# Patient Record
Sex: MALE | Race: WHITE | Employment: FULL TIME | URBAN - METROPOLITAN AREA
[De-identification: names, ages, dates, MRNs, and addresses within clinical notes are randomized per-mention and may not be internally consistent; named-entity substitution may affect disease eponyms.]

---

## 2019-02-05 ENCOUNTER — OFFICE VISIT (OUTPATIENT)
Dept: UROLOGY | Facility: CLINIC | Age: 29
End: 2019-02-05
Payer: COMMERCIAL

## 2019-02-05 VITALS — SYSTOLIC BLOOD PRESSURE: 146 MMHG | RESPIRATION RATE: 16 BRPM | HEART RATE: 85 BPM | DIASTOLIC BLOOD PRESSURE: 95 MMHG

## 2019-02-05 DIAGNOSIS — Z30.09 ENCOUNTER FOR VASECTOMY COUNSELING: Primary | ICD-10-CM

## 2019-02-05 PROCEDURE — 99201 ZZC OFFICE/OUTPT VISIT, NEW, LEVEL I: CPT | Performed by: UROLOGY

## 2019-02-05 NOTE — NURSING NOTE
Initial BP (!) 146/95 (BP Location: Right arm, Patient Position: Chair, Cuff Size: Adult Regular)   Pulse 85   Resp 16  There is no height or weight on file to calculate BMI. .    martín mcmillan LPN

## 2019-02-06 NOTE — PROGRESS NOTES
Ta Rivera  YOB: 1990  MRN:  [id number]    This is a 28 year old male requesting vasectomy counseling.    The risks and benefits of the procedure including the possible association with prostate cancer were discussed.    On examination the vas deferens were easily palpable.    Impression/Plan:  Satisfactory candidate for elective vasectomy

## 2020-08-10 ENCOUNTER — APPOINTMENT (OUTPATIENT)
Dept: GENERAL RADIOLOGY | Facility: CLINIC | Age: 30
End: 2020-08-10
Attending: NURSE PRACTITIONER
Payer: COMMERCIAL

## 2020-08-10 ENCOUNTER — HOSPITAL ENCOUNTER (EMERGENCY)
Facility: CLINIC | Age: 30
Discharge: HOME | End: 2020-08-10
Attending: NURSE PRACTITIONER | Admitting: NURSE PRACTITIONER
Payer: COMMERCIAL

## 2020-08-10 VITALS
SYSTOLIC BLOOD PRESSURE: 156 MMHG | TEMPERATURE: 98.8 F | RESPIRATION RATE: 16 BRPM | DIASTOLIC BLOOD PRESSURE: 88 MMHG | WEIGHT: 230 LBS

## 2020-08-10 DIAGNOSIS — S49.92XA SHOULDER INJURY, LEFT, INITIAL ENCOUNTER: Primary | ICD-10-CM

## 2020-08-10 PROCEDURE — 99284 EMERGENCY DEPT VISIT MOD MDM: CPT | Mod: Z6 | Performed by: NURSE PRACTITIONER

## 2020-08-10 PROCEDURE — 99283 EMERGENCY DEPT VISIT LOW MDM: CPT | Performed by: NURSE PRACTITIONER

## 2020-08-10 PROCEDURE — 73030 X-RAY EXAM OF SHOULDER: CPT | Mod: LT

## 2020-08-10 ASSESSMENT — ENCOUNTER SYMPTOMS
VOMITING: 0
NAUSEA: 0
DIARRHEA: 0
CHILLS: 0
DIAPHORESIS: 0
COUGH: 0
BLOOD IN STOOL: 0
HEMATURIA: 0
WOUND: 0
DIFFICULTY URINATING: 0
SPEECH DIFFICULTY: 0
MYALGIAS: 0
DYSURIA: 0
SHORTNESS OF BREATH: 0
WHEEZING: 0
FEVER: 0
EYE PAIN: 0
SORE THROAT: 0
ABDOMINAL PAIN: 0

## 2020-08-10 NOTE — ED NOTES
Pt reports pain to left shoulder, worsens with movement. Pt was riding a scooter last night, fell off landing on left shoulder. Pt denies hitting head, pt was wearing a helmet

## 2020-08-10 NOTE — ED AVS SNAPSHOT
[facility] Emergency Department  [address] [address]  Phone:  [phone]  Fax:  [phone]                                    Ta Rivera   MRN: [id number]    Department:  [facility] Emergency Department   Date of Visit:  8/10/2020           After Visit Summary Signature Page    I have received my discharge instructions, and my questions have been answered. I have discussed any challenges I see with this plan with the nurse or doctor.    ..........................................................................................................................................  Patient/Patient Representative Signature      ..........................................................................................................................................  Patient Representative Print Name and Relationship to Patient    ..................................................               ................................................  Date                                   Time    ..........................................................................................................................................  Reviewed by Signature/Title    ...................................................              ..............................................  Date                                               Time          22EPIC Rev 08/18

## 2020-08-10 NOTE — ED PROVIDER NOTES
History     Chief Complaint   Patient presents with     Shoulder Pain     R after skateboard accident yesterday     HPI  Ta Rivera is a 30 year old male with unremarkable past medical past surgical history currently smoking cigarettes who presents the emergency room with left shoulder pain following falling off his skateboard/scooter last evening and landing on his left shoulder region.  Patient reports acute left shoulder pain that he cites is a 2 out of 10 on a pain scale at rest and a 6 out of 10 on the pain scale with movement.  Patient reports overhead reaching is painful.  Patient reports reaching and pushing and pulling is painful.  Patient denies loss of sensation or strength.  Patient states that he took 3 tablets of ibuprofen last night and 3 tablets this morning and this has helped somewhat with his pain.  Patient denies previous injury to his left shoulder.    Allergies:  No Known Allergies    Problem List:    There are no active problems to display for this patient.       Past Medical History:    History reviewed. No pertinent past medical history.    Past Surgical History:    History reviewed. No pertinent surgical history.    Family History:    History reviewed. No pertinent family history.    Social History:  Marital Status:   [2]  Social History     Tobacco Use     Smoking status: Current Every Day Smoker     Packs/day: 1.00     Years: 7.00     Pack years: 7.00     Types: Cigarettes     Smokeless tobacco: Never Used   Substance Use Topics     Alcohol use: Yes     Alcohol/week: 24.0 standard drinks     Types: 24 Cans of beer per week     Drug use: Never        Medications:    No current outpatient medications on file.        Review of Systems   Constitutional: Negative for chills, diaphoresis and fever.   HENT: Negative for ear pain and sore throat.    Eyes: Negative for pain.   Respiratory: Negative for cough, shortness of breath and wheezing.    Cardiovascular: Negative for chest  pain.   Gastrointestinal: Negative for abdominal pain, blood in stool, diarrhea, nausea and vomiting.   Genitourinary: Negative for difficulty urinating, dysuria and hematuria.   Musculoskeletal: Negative for myalgias.        Left shoulder pain without swelling, deformity   Skin: Negative for rash and wound.   Neurological: Negative for speech difficulty.   Psychiatric/Behavioral: Negative for self-injury.   All other systems reviewed and are negative.      Physical Exam   BP: (!) 156/88  Heart Rate: 69  Temp: 98.8  F (37.1  C)  Resp: 16  Weight: 104.3 kg (230 lb)      Physical Exam  Vitals signs and nursing note reviewed.   Constitutional:       General: He is not in acute distress.     Appearance: He is well-developed. He is not diaphoretic.   HENT:      Head: Normocephalic and atraumatic.      Right Ear: External ear normal.      Left Ear: External ear normal.      Nose: Nose normal.   Eyes:      General:         Right eye: No discharge.         Left eye: No discharge.      Conjunctiva/sclera: Conjunctivae normal.   Cardiovascular:      Rate and Rhythm: Normal rate and regular rhythm.      Heart sounds: Normal heart sounds. No murmur. No friction rub. No gallop.    Pulmonary:      Effort: Pulmonary effort is normal.      Breath sounds: Normal breath sounds. No stridor. No wheezing.   Abdominal:      Tenderness: There is no abdominal tenderness.   Musculoskeletal:      Left shoulder: He exhibits tenderness (at ac joint without deformity) and bony tenderness (AC joint). He exhibits normal range of motion, no swelling, no effusion, no crepitus, no deformity, no laceration, no pain, no spasm, normal pulse and normal strength.   Skin:     General: Skin is warm.      Findings: No rash.   Neurological:      Mental Status: He is alert and oriented to person, place, and time.         ED Course        Procedures      Results for orders placed or performed during the hospital encounter of 08/10/20 (from the past 24 hour(s))    XR Shoulder Left 2 Views    Narrative    XR SHOULDER 2 VIEW LEFT 8/10/2020 12:14 PM     HISTORY: fell off scooter yesterday persistent pain, pain is at AC  joint, concern for possible separation versus clavicle fracture, no  tenting.      Impression    IMPRESSION: No apparent fracture or dislocation. No AC joint widening.    KALANI FISH MD       Medications - No data to display    Assessments & Plan (with Medical Decision Making).  Ta Rivera is a 30 year old male with unremarkable past medical past surgical history currently smoking cigarettes who presents the emergency room with left shoulder pain following falling off his skateboard/scooter last evening and landing on his left shoulder region.  On exam patient has no obvious bony deformities and no tenting of his shoulder region.  Patient has neurovascularly stable and strength is equal bilateral upper arms.  X-ray of the shoulder obtained to evaluate for AC joint separation, fracture, dislocation and reveals no obvious fracture dislocation or AC AC joint separation.  Will treat as acute shoulder injury with possible sub-radiological AC joint separation and placed in sling with orthopedic follow-up.  Discussed Tylenol and ibuprofen for pain management.  Patient discharged in stable condition     I have reviewed the nursing notes.    I have reviewed the findings, diagnosis, plan and need for follow up with the patient.    There are no discharge medications for this patient.      Final diagnoses:   Shoulder injury, left, initial encounter       8/10/2020   [facility] EMERGENCY DEPARTMENT     Ne Gaston APRN CNP  08/10/20 [id number]

## 2020-08-10 NOTE — DISCHARGE INSTRUCTIONS
Use sling for comfort and rest for the next week.  Follow-up with orthopedics for reevaluation.  They will call you and schedule sometime within the next week.  In 5 days start doing circling exercises.  You may take Tylenol 1000 mg up to 4 times daily as needed for pain management if pain is more severe than this you may add ibuprofen 600 mg and take this at the same time as you take the Tylenol for additional pain management.  Return to the emergency room if you develop sudden loss of sensation of your fingers or arm or acute worsening of pain.

## 2020-08-10 NOTE — ED TRIAGE NOTES
Pt here with pain in the L shoulder after he fell on the skateboard yesterday onto the L shoulder into the ditch. Pt denies hitting his head and is not on any blood thinners.

## 2020-08-14 ENCOUNTER — OFFICE VISIT (OUTPATIENT)
Dept: ORTHOPEDICS | Facility: CLINIC | Age: 30
End: 2020-08-14
Payer: COMMERCIAL

## 2020-08-14 VITALS — WEIGHT: 230 LBS | DIASTOLIC BLOOD PRESSURE: 70 MMHG | SYSTOLIC BLOOD PRESSURE: 110 MMHG

## 2020-08-14 DIAGNOSIS — S49.92XA SHOULDER INJURY, LEFT, INITIAL ENCOUNTER: ICD-10-CM

## 2020-08-14 PROCEDURE — 99203 OFFICE O/P NEW LOW 30 MIN: CPT | Performed by: FAMILY MEDICINE

## 2020-08-14 NOTE — LETTER
8/14/2020         RE: Ta Rivera  [address] [facility] [address]        Dear Colleague,    Thank you for referring your patient, Ta Rivera, to the [facility]. Please see a copy of my visit note below.    ASSESSMENT & PLAN  Ta was seen today for pain.    Diagnoses and all orders for this visit:    Shoulder injury, left, initial encounter  -     Orthopedic & Spine  Referral      Patient is a 30 year old male presenting for evaluation of   Chief Complaint   Patient presents with     Left Shoulder - Pain      # Left AC Separation: Notable after a fall from an electric scooter with acute pain over AC joint and TTP over joint with pos cross arm test.  Pain improved while in sling.  XR in ER on 8/10/20 neg for fracture or sig AC separation.  Likely grade 1 AC separation.  Counseled patient on nature of condition and treatment options.  Given this plan as below, follow-up as needed  Treatment: sling as needed for the next week, ice PRN, no work restrictions  Physical Therapy HEP given today, if not improved can refer for formal PT    Medications  Limited NSAIDs/Tylenol    Concerning signs/sx that would warrant urgent evaluation were discussed.  All questions were answered, patient understands and agrees with plan.      Return if symptoms worsen or fail to improve.    -----    SUBJECTIVE  Ta Rivera is a/an 30 year old Right handed male who is seen in consultation at the request of  Ne Gaston C.N.P. for evaluation of left shoulder pain. The patient is seen by themselves.    Onset: 8/10/20, 4 day(s) ago. Patient describes injury as e scooter, fall onto shoulder. Patient was seen in ED 8/10/20 and xrays were performed.   Location of Pain: left superior shoulder   Rating of Pain at worst: 7/10  Rating of Pain Currently: 2/10  Worsened by: lifting, shoulder flexion, reaching   Better with: sling   Treatments tried: ice, Ibuprofen,  sling  Associated symptoms: no distal numbness or tingling; denies swelling or warmth  Orthopedic history: NO  Relevant surgical history: NO  Work: manages burial lines for [employer]  Social: 3 kids   History reviewed. No pertinent past medical history.  Social History     Socioeconomic History     Marital status:      Spouse name: None     Number of children: None     Years of education: None     Highest education level: None   Occupational History     None   Social Needs     Financial resource strain: None     Food insecurity     Worry: None     Inability: None     Transportation needs     Medical: None     Non-medical: None   Tobacco Use     Smoking status: Current Every Day Smoker     Packs/day: 1.00     Years: 7.00     Pack years: 7.00     Types: Cigarettes     Smokeless tobacco: Never Used   Substance and Sexual Activity     Alcohol use: Yes     Alcohol/week: 24.0 standard drinks     Types: 24 Cans of beer per week     Drug use: Never     Sexual activity: None   Lifestyle     Physical activity     Days per week: None     Minutes per session: None     Stress: None   Relationships     Social connections     Talks on phone: None     Gets together: None     Attends Shinto service: None     Active member of club or organization: None     Attends meetings of clubs or organizations: None     Relationship status: None     Intimate partner violence     Fear of current or ex partner: None     Emotionally abused: None     Physically abused: None     Forced sexual activity: None   Other Topics Concern     None   Social History Narrative     None         Patient's past medical, surgical, social, and family histories were reviewed today and no changes are noted.  No family history pertinent to the patient's problem today    REVIEW OF SYSTEMS:  10 point ROS is negative other than symptoms noted above in HPI, Past Medical History or as stated below  Constitutional: NEGATIVE for fever, chills, change in  weight  Skin: NEGATIVE for worrisome rashes, moles or lesions  GI/: NEGATIVE for bowel or bladder changes  Neuro: NEGATIVE for weakness, dizziness or paresthesias    OBJECTIVE:  /70   Wt 104.3 kg (230 lb)    General: healthy, alert and in no distress  HEENT: no scleral icterus or conjunctival erythema  Skin: no suspicious lesions or rash. No jaundice.  CV: regular rhythm by palpation  Resp: normal respiratory effort without conversational dyspnea   Psych: normal mood and affect  Gait: normal steady gait with appropriate coordination and balance  Neuro: normal light touch sensory exam of the bilateral upper extremities.    MSK:  LEFT SHOULDER  Inspection:    no swelling, bruising, discoloration, or obvious deformity or asymmetry  Palpation:    Tender about the AC joint. Remainder of bony and tendinous landmarks are nontender.  Active Range of Motion:     Abduction 1650, FF 1650, , IR L4.    Strength:    Scapular plane abduction 5/5,  ER 5/5, IR 5/5, biceps 5/5, triceps 5/5  Special Tests:    Positive: crossed arm adduction and [place]'s    Negative: Neer's, Miller', supraspinatus (empty can), Speed's and Yergason's    CERVICAL SPINE  Inspection:    normal cervical lordosis present, rounded shoulders, forward head posture  Palpation:  Nontender.  Range of Motion:     Flexion full    Extension full    Right side bend full    Left side bend full    Right rotation full    Left rotation full  Strength:    Full strength throughout all neck muscles  Special Tests:    Positive: None    Negative: Spurling's (bilateral)    Independent visualization of the below image:  No results found for this or any previous visit (from the past 24 hour(s)).  XR SHOULDER 2 VIEW LEFT 8/10/2020 12:14 PM      HISTORY: fell off scooter yesterday persistent pain, pain is at AC  joint, concern for possible separation versus clavicle fracture, no  tenting.                                                                       IMPRESSION: No apparent fracture or dislocation. No AC joint widening.     MD Brad CANO MD [facility]      Again, thank you for allowing me to participate in the care of your patient.        Sincerely,        Brad Ramos MD

## 2020-08-14 NOTE — PROGRESS NOTES
ASSESSMENT & PLAN  Ta was seen today for pain.    Diagnoses and all orders for this visit:    Shoulder injury, left, initial encounter  -     Orthopedic & Spine  Referral      Patient is a 30 year old male presenting for evaluation of   Chief Complaint   Patient presents with     Left Shoulder - Pain      # Left AC Separation: Notable after a fall from an electric scooter with acute pain over AC joint and TTP over joint with pos cross arm test.  Pain improved while in sling.  XR in ER on 8/10/20 neg for fracture or sig AC separation.  Likely grade 1 AC separation.  Counseled patient on nature of condition and treatment options.  Given this plan as below, follow-up as needed  Treatment: sling as needed for the next week, ice PRN, no work restrictions  Physical Therapy HEP given today, if not improved can refer for formal PT    Medications  Limited NSAIDs/Tylenol    Concerning signs/sx that would warrant urgent evaluation were discussed.  All questions were answered, patient understands and agrees with plan.      Return if symptoms worsen or fail to improve.    -----    SUBJECTIVE  Ta Rivera is a/an 30 year old Right handed male who is seen in consultation at the request of  Ne Gaston C.N.P. for evaluation of left shoulder pain. The patient is seen by themselves.    Onset: 8/10/20, 4 day(s) ago. Patient describes injury as e scooter, fall onto shoulder. Patient was seen in ED 8/10/20 and xrays were performed.   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 No pertinent past medical history.  Social History      Socioeconomic History     Marital status:      Spouse name: None     Number of children: None     Years of education: None     Highest education level: None   Occupational History     None   Social Needs     Financial resource strain: None     Food insecurity     Worry: None     Inability: None     Transportation needs     Medical: None     Non-medical: None   Tobacco Use     Smoking status: Current Every Day Smoker     Packs/day: 1.00     Years: 7.00     Pack years: 7.00     Types: Cigarettes     Smokeless tobacco: Never Used   Substance and Sexual Activity     Alcohol use: Yes     Alcohol/week: 24.0 standard drinks     Types: 24 Cans of beer per week     Drug use: Never     Sexual activity: None   Lifestyle     Physical activity     Days per week: None     Minutes per session: None     Stress: None   Relationships     Social connections     Talks on phone: None     Gets together: None     Attends Evangelical service: None     Active member of club or organization: None     Attends meetings of clubs or organizations: None     Relationship status: None     Intimate partner violence     Fear of current or ex partner: None     Emotionally abused: None     Physically abused: None     Forced sexual activity: None   Other Topics Concern     None   Social History Narrative     None         Patient's past medical, surgical, social, and family histories were reviewed today and no changes are noted.  No family history pertinent to the patient's problem today    REVIEW OF SYSTEMS:  10 point ROS is negative other than symptoms noted above in HPI, Past Medical History or as stated below  Constitutional: NEGATIVE for fever, chills, change in weight  Skin: NEGATIVE for worrisome rashes, moles or lesions  GI/: NEGATIVE for bowel or bladder changes  Neuro: NEGATIVE for weakness, dizziness or paresthesias    OBJECTIVE:  /70   Wt 104.3 kg (230 lb)    General: healthy, alert and in no distress  HEENT: no scleral  icterus or conjunctival erythema  Skin: no suspicious lesions or rash. No jaundice.  CV: regular rhythm by palpation  Resp: normal respiratory effort without conversational dyspnea   Psych: normal mood and affect  Gait: normal steady gait with appropriate coordination and balance  Neuro: normal light touch sensory exam of the bilateral upper extremities.    MSK:  LEFT SHOULDER  Inspection:    no swelling, bruising, discoloration, or obvious deformity or asymmetry  Palpation:    Tender about the AC joint. Remainder of bony and tendinous landmarks are nontender.  Active Range of Motion:     Abduction 1650, FF 1650, , IR L4.    Strength:    Scapular plane abduction 5/5,  ER 5/5, IR 5/5, biceps 5/5, triceps 5/5  Special Tests:    Positive: crossed arm adduction and [place]'s    Negative: Neer's, Miller', supraspinatus (empty can), Speed's and Yergason's    CERVICAL SPINE  Inspection:    normal cervical lordosis present, rounded shoulders, forward head posture  Palpation:  Nontender.  Range of Motion:     Flexion full    Extension full    Right side bend full    Left side bend full    Right rotation full    Left rotation full  Strength:    Full strength throughout all neck muscles  Special Tests:    Positive: None    Negative: Spurling's (bilateral)    Independent visualization of the below image:  No results found for this or any previous visit (from the past 24 hour(s)).  XR SHOULDER 2 VIEW LEFT 8/10/2020 12:14 PM      HISTORY: fell off scooter yesterday persistent pain, pain is at AC  joint, concern for possible separation versus clavicle fracture, no  tenting.                                                                      IMPRESSION: No apparent fracture or dislocation. No AC joint widening.     MD Brad CANO MD [facility]

## 2020-08-14 NOTE — PATIENT INSTRUCTIONS
Diagnosis: Left AC sprain grade 1  Image Findings: no fracture on previous x-ray  Treatment: sling for the next week, transition out first at home then outside  Job: No restrictions  Medications: Limited tylenol/ibuprofen for pain for 1-2 weeks  Follow-up: As needed    Please call [phone]   Ask for my team if you have any questions or concerns    It was great seeing you today!    Brad Ramos

## 2021-02-18 ENCOUNTER — TELEPHONE (OUTPATIENT)
Dept: UROLOGY | Facility: CLINIC | Age: 31
End: 2021-02-18

## 2021-02-18 NOTE — TELEPHONE ENCOUNTER
Reason for Call:  Other appointment    Detailed comments: Pt calling for he came in for a vasectomy consult and now would like to request an appointment on Dr. Sarabia's next available opening .     Phone Number Patient can be reached at: Home number on file [phone] (home)    Best Time: anytime    Can we leave a detailed message on this number? YES    Call taken on 2/18/2021 at 3:46 PM by Suman Velázquez

## 2021-04-02 ENCOUNTER — TELEPHONE (OUTPATIENT)
Dept: UROLOGY | Facility: CLINIC | Age: 31
End: 2021-04-02

## 2021-04-02 NOTE — TELEPHONE ENCOUNTER
Pt reports he had lost the paperwork on how to prepare for surgery.  Pt was advised and does not have any further questions.    Taylor Stroud  [facility] RN

## 2021-04-02 NOTE — TELEPHONE ENCOUNTER
Patient called in to confirm his appointment for 4/06/21 with Dr. Sarabia and wanted to know if there is a preparation he needs to do before his appointment. Please call patient at [phone].  Jagruti Velarde   [facility]  Central Scheduler

## 2021-04-06 ENCOUNTER — OFFICE VISIT (OUTPATIENT)
Dept: UROLOGY | Facility: CLINIC | Age: 31
End: 2021-04-06
Payer: COMMERCIAL

## 2021-04-06 VITALS — HEART RATE: 74 BPM | RESPIRATION RATE: 18 BRPM | SYSTOLIC BLOOD PRESSURE: 149 MMHG | DIASTOLIC BLOOD PRESSURE: 95 MMHG

## 2021-04-06 DIAGNOSIS — Z30.2 ENCOUNTER FOR VASECTOMY: Primary | ICD-10-CM

## 2021-04-06 PROCEDURE — 55250 REMOVAL OF SPERM DUCT(S): CPT | Performed by: UROLOGY

## 2021-04-06 NOTE — NURSING NOTE
Chief Complaint   Patient presents with     Sterilization       Initial BP (!) 149/95 (BP Location: Right arm, Patient Position: Chair, Cuff Size: Adult Regular)   Pulse 74   Resp 18  There is no height or weight on file to calculate BMI.  BP completed using cuff size: regular long   Medications and allergies reviewed.      Monique GUSMAN, CMA

## 2021-04-06 NOTE — PATIENT INSTRUCTIONS
Post-Vasectomy Instructions    1. Avoid wearing a boxer type brief, more support is recommended.     2. Rest for the rest of the day.  Apply ice packs to the area where the incisions were made making sure that you have a towel or some type of barrier between your skin and the ice.    3.  The day following surgery you may engage in any activity which does not cause discomfort.  Be certain to wear a supporter if you become involved in vigorous physical activity.     4.  You may resume your usual activities by the third day, including work.  Let your body be your guide.  If it hurts you probably shouldn't be doing it.    5.  [place] may be resumed when tolerated and scrotal swelling has resolved.     6.  You may resume showers at the end of the second day.  You may cleanse the area with mild soap and allow to dry.  Do not take baths until incisions are healed/stitches have dissolved, which should occur within 2-3weeks .    7.  You may use over the counter Tylenol for pain control (use as directed on bottle), but you will be given a prescription for something stronger incase you need it.      8.  You should consider yourself fertile until a sperm count establishes that you are sterile.  Use some form of protection until then.  You will need to arrange for a semen analysis 12 and 14 weeks after the procedure.  Semen analysis instructions were given at appointment.      9. Report anything unusual to your doctor (I.e. Fever, excessive swelling, pain not relieved by medication or excessive bleeding).  Call [facility] at [phone] and ask to speak with the Urology Care Team.

## 2021-04-06 NOTE — NURSING NOTE
The patient comes in today for a vasectomy.  Aftercare instructions and post semen analysis were discussed and handouts given.  Pt identification was completed with 2 identifiers and informed consent was obtained.      Home preparation was completed by the pt.  The patient was placed in a supine position on the procedure table.  Then I sterilely prepped and draped the pt in the usual fashion.  Dr. Sarabia notified.      Monique GUSMAN CMA

## 2021-04-07 PROBLEM — F10.20 ALCOHOL DEPENDENCE (H): Status: ACTIVE | Noted: 2021-04-07

## 2021-04-07 NOTE — PROGRESS NOTES
PREOPERATIVE DIAGNOSIS: Voluntary sterilization.     POSTOPERATIVE DIAGNOSIS: Voluntary sterilization.     PROCEDURE: Bilateral partial vasectomy.     SURGEON: Harshal Sarabia MD     ANESTHESIA: Local.     SUBJECTIVE:    PROCEDURE: The patient was brought to the procedure room where he was placed in the supine position. Pause for the cause was completed. He was prepped and draped in a sterile fashion. The patient's left vas deferens was isolated under the skin and local infiltration was used with 1% lidocaine (without epinephrine) anesthetic.    A small incision was made over the vas deferens which was dissected from its surrounding tissues.  Weck clips were applied proximally and distally and the vas deferens was transected.  The exposed ends of the vas deferens were then cauterized.    The exact same procedure was performed on the right side and the hemostasis was rechecked bilaterally and found to be satisfactory and the wounds were closed with a single suture of 3-0 Chromic through the skin and dartos layers. The patient tolerated the procedure well and was discharged in satisfactory condition.    The patient was instructed to return in 3 months with his first semen analysis and warned that he should presume fertility until the semen samples demonstrate an absence of sperm.